# Patient Record
Sex: FEMALE | Race: WHITE | NOT HISPANIC OR LATINO
[De-identification: names, ages, dates, MRNs, and addresses within clinical notes are randomized per-mention and may not be internally consistent; named-entity substitution may affect disease eponyms.]

---

## 2019-07-12 ENCOUNTER — TRANSCRIPTION ENCOUNTER (OUTPATIENT)
Age: 59
End: 2019-07-12

## 2021-03-31 ENCOUNTER — TRANSCRIPTION ENCOUNTER (OUTPATIENT)
Age: 61
End: 2021-03-31

## 2021-04-01 ENCOUNTER — TRANSCRIPTION ENCOUNTER (OUTPATIENT)
Age: 61
End: 2021-04-01

## 2021-04-01 ENCOUNTER — EMERGENCY (EMERGENCY)
Facility: HOSPITAL | Age: 61
LOS: 1 days | Discharge: HOME | End: 2021-04-01
Attending: EMERGENCY MEDICINE
Payer: COMMERCIAL

## 2021-04-01 VITALS
RESPIRATION RATE: 16 BRPM | SYSTOLIC BLOOD PRESSURE: 111 MMHG | DIASTOLIC BLOOD PRESSURE: 80 MMHG | HEART RATE: 58 BPM | OXYGEN SATURATION: 100 % | TEMPERATURE: 98 F

## 2021-04-01 VITALS
DIASTOLIC BLOOD PRESSURE: 81 MMHG | OXYGEN SATURATION: 100 % | WEIGHT: 128.97 LBS | TEMPERATURE: 97 F | SYSTOLIC BLOOD PRESSURE: 172 MMHG | RESPIRATION RATE: 16 BRPM | HEART RATE: 93 BPM | HEIGHT: 61 IN

## 2021-04-01 PROCEDURE — 73110 X-RAY EXAM OF WRIST: CPT

## 2021-04-01 PROCEDURE — 73080 X-RAY EXAM OF ELBOW: CPT | Mod: 26,RT

## 2021-04-01 PROCEDURE — 73080 X-RAY EXAM OF ELBOW: CPT

## 2021-04-01 PROCEDURE — 73090 X-RAY EXAM OF FOREARM: CPT | Mod: 26,LT

## 2021-04-01 PROCEDURE — 73130 X-RAY EXAM OF HAND: CPT

## 2021-04-01 PROCEDURE — 90715 TDAP VACCINE 7 YRS/> IM: CPT

## 2021-04-01 PROCEDURE — 73130 X-RAY EXAM OF HAND: CPT | Mod: 26,RT,77

## 2021-04-01 PROCEDURE — 99284 EMERGENCY DEPT VISIT MOD MDM: CPT

## 2021-04-01 PROCEDURE — 99285 EMERGENCY DEPT VISIT HI MDM: CPT | Mod: 25

## 2021-04-01 PROCEDURE — 73090 X-RAY EXAM OF FOREARM: CPT

## 2021-04-01 PROCEDURE — 73130 X-RAY EXAM OF HAND: CPT | Mod: 26,RT

## 2021-04-01 PROCEDURE — 73110 X-RAY EXAM OF WRIST: CPT | Mod: 26,RT

## 2021-04-01 PROCEDURE — 26605 TREAT METACARPAL FRACTURE: CPT | Mod: RT

## 2021-04-01 RX ORDER — IBUPROFEN 200 MG
600 TABLET ORAL ONCE
Refills: 0 | Status: COMPLETED | OUTPATIENT
Start: 2021-04-01 | End: 2021-04-01

## 2021-04-01 RX ORDER — ACETAMINOPHEN 500 MG
650 TABLET ORAL ONCE
Refills: 0 | Status: COMPLETED | OUTPATIENT
Start: 2021-04-01 | End: 2021-04-01

## 2021-04-01 RX ORDER — TETANUS TOXOID, REDUCED DIPHTHERIA TOXOID AND ACELLULAR PERTUSSIS VACCINE, ADSORBED 5; 2.5; 8; 8; 2.5 [IU]/.5ML; [IU]/.5ML; UG/.5ML; UG/.5ML; UG/.5ML
0.5 SUSPENSION INTRAMUSCULAR ONCE
Refills: 0 | Status: COMPLETED | OUTPATIENT
Start: 2021-04-01 | End: 2021-04-01

## 2021-04-01 RX ADMIN — Medication 650 MILLIGRAM(S): at 13:57

## 2021-04-01 RX ADMIN — Medication 650 MILLIGRAM(S): at 13:56

## 2021-04-01 RX ADMIN — TETANUS TOXOID, REDUCED DIPHTHERIA TOXOID AND ACELLULAR PERTUSSIS VACCINE, ADSORBED 0.5 MILLILITER(S): 5; 2.5; 8; 8; 2.5 SUSPENSION INTRAMUSCULAR at 13:56

## 2021-04-01 RX ADMIN — Medication 600 MILLIGRAM(S): at 16:23

## 2021-04-01 NOTE — ED PROVIDER NOTE - CARE PROVIDER_API CALL
Tung Holden  PLASTIC SURGERY  [address] [place] [address]  Phone: [phone]  Fax: [phone]  Follow Up Time:

## 2021-04-01 NOTE — ED ADULT NURSE NOTE - OBJECTIVE STATEMENT
61 year old female pt presented to the ED s/p fall yesterday, pt states her foot got caught on something and fell primarily on her right side, ecchymosis to right hand and palm, with abrasion to right hand and forearm, hematoma to head pt denies LOC, states she took a warm epsom salt bath and felt better, pt denies syncope no nausea no vomiting denies being on any blood thinners

## 2021-04-01 NOTE — ED PROVIDER NOTE - NS ED ROS FT
GENERAL: No fever, chills  EYES: no vision changes, no discharge.   ENT: no difficulty swallowing or speaking   CARDIAC: no chest pain/pressure, SOB, lower extremity swelling  PULMONARY: no cough, SOB  GI: no abdominal pain, n/v/d  : no dysuria  SKIN: no rashes  NEURO: no headache, lightheadedness, paresthesia  MSK: + R hand, wrist, elbow, and ankle pain and paraspinal lumbar pain

## 2021-04-01 NOTE — ED PROVIDER NOTE - PHYSICAL EXAMINATION
Maite Jackson MD  GENERAL: Patient awake alert NAD.  HEENT: NC/AT, Moist mucous membranes, PERRL, EOMI.  LUNGS: CTAB, no wheezes or crackles.   CARDIAC: RRR, no m/r/g.    ABDOMEN: Soft, NT, ND, No rebound, guarding  EXT: No edema. No calf tenderness. CV 2+DP/PT bilaterally.   R ankle tender at lateral aspect. No edema or ecchymosis.  R hand tender at 5th digit + edema and +ecchymosis. sensation intact, movement intact, mild outward angulation of 5th digit.  R hand able to thumbs up, a okay, cross fingers.  R elbow tender at radial epicondyl   MSK: No spinal tenderness, no pain with movement, no deformities. +R lumbar paraspinal tenderness. no skin changes  NEURO: A&Ox3. Moving all extremities. cn 2-12 grossly intact  SKIN: Warm and dry. No rash.  PSYCH: Normal affect. Maite Jackson MD  GENERAL: Patient awake alert NAD.  HEENT: NC/AT, Moist mucous membranes, PERRL, EOMI.  LUNGS: CTAB, no wheezes or crackles.   CARDIAC: RRR, no m/r/g.    ABDOMEN: Soft, NT, ND, No rebound, guarding  EXT: No edema. No calf tenderness. CV 2+DP/PT bilaterally.   R ankle tender at lateral aspect. No edema or ecchymosis.  R hand tender at 5th digit + edema and +ecchymosis. sensation intact, movement intact, mild outward angulation of 5th digit.  R hand able to thumbs up, a okay, cross fingers.  R elbow tender at radial epicondyl   Pelvis stable, non tender. Shoulders non-tender, full ROM.  MSK: No spinal tenderness, no pain with movement, no deformities. +R lumbar paraspinal tenderness. no skin changes  NEURO: A&Ox3. Moving all extremities. cn 2-12 grossly intact  SKIN: Warm and dry. No rash.  PSYCH: Normal affect.

## 2021-04-01 NOTE — ED PROVIDER NOTE - CLINICAL SUMMARY MEDICAL DECISION MAKING FREE TEXT BOX
Manuel: Pt is a 60 yo F with a h/o essential tremors and colitis who presents with R hand, wrist, elbow, and ankle pain s/p fall. Concern for fractures, will xray R hand, wrist, elbow, ankle. Pain control. Per nexus, no clinical indication for CT head/neck. Manuel: Pt is a 60 yo F with a h/o essential tremors and colitis who presents with R hand, wrist, elbow, and ankle pain s/p fall. Concern for fractures, will xray R hand, wrist, elbow, ankle. Pain control. Per nexus, no clinical indication for CT head/neck. Abrasion by wrist is superficial, no concern for open fx

## 2021-04-01 NOTE — ED PROVIDER NOTE - OBJECTIVE STATEMENT
Pt is a 60 yo F with a h/o essential tremors and colitis who presents with R hand, wrist, elbow, and ankle pain s/p fall. She reports a trip and fall yesterday on wood anel, mechanical fall, no palpitations or lightheadedness before. She hit her head but no LOC and is not on AC. She was able to get up by herself and ambulate on scene. She went to urgent care today and was sent to the ed for xrays. Does not remember her last tetanus shot and has small abrasion on R forearm. She denies numbness, endorses mild tingling and weakness in R 5th digit. Endorses paraspinal lumbar pain

## 2021-04-01 NOTE — ED PROVIDER NOTE - CARE PLAN
Principal Discharge DX:	Metacarpal bone fracture   Principal Discharge DX:	Metacarpal bone fracture  Goal:	RIGHT 5th

## 2021-04-01 NOTE — ED CLERICAL - NS ED CLERK NOTE PRE-ARRIVAL INFORMATION; ADDITIONAL PRE-ARRIVAL INFORMATION
CC/Reason For referral:  fell yesterday, hit head with no loc.  whole right side bruised. r/o broken bones  Preferred Consultant(if applicable):  Who admits for you (if needed):  Do you have documents you would like to fax over?  Would you still like to speak to an ED attending?  please call after patient is seen

## 2021-04-01 NOTE — ED PROVIDER NOTE - PATIENT PORTAL LINK FT
You can access the FollowMyHealth Patient Portal offered by [facility] by registering at the following website: [email or web address]. By joining [employer]’s FollowMyHealth portal, you will also be able to view your health information using other applications (apps) compatible with our system. You can access the FollowMyHealth Patient Portal offered by [facility] by registering at the following website: [email or web address]. By joining [employer]’s FollowMyHealth portal, you will also be able to view your health information using other applications (apps) compatible with our system.

## 2021-04-01 NOTE — ED PROVIDER NOTE - ATTENDING CONTRIBUTION TO CARE
Attending MD Marie: I personally have seen and examined this patient.  Resident note reviewed and agree on plan of care and except where noted.  See below for details.     Seen in [place] 65    61F with PMH/PSH including essential tremors, colitis presents to the ED with R hand/wrist/elbow pain, ankle pain s/p trip and fall on wood anel.  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  Denies LOC.  Reports hit R side of head.  Denies change in vision, double vision, sudden loss of vision, headache.  Denies neck pain, reports mild lower back pain.  Reports was able to get up and ambulate after fall.  Denies numbness, weakness in extremities. Denies loss of urinary or bowel continence. Denies chest pain, shortness of breath, palpitations. Denies abdominal pain, nausea, vomiting, diarrhea, blood in stools. Denies dysuria, hematuria, change in urinary habits including frequency, urgency. Denies fevers, chills.       TO BE COMPLETED Attending MD Marie: I personally have seen and examined this patient.  Resident note reviewed and agree on plan of care and except where noted.  See below for details.     Seen in [place] 65    61F with PMH/PSH including essential tremors, colitis presents to the ED with R hand/wrist/elbow pain, ankle pain s/p trip and fall on wood anel.  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  Denies LOC.  Reports hit R side of head.  Denies change in vision, double vision, sudden loss of vision, headache.  Denies neck pain, reports mild lower back pain.  Reports was able to get up and ambulate after fall.  Denies numbness, weakness in extremities. Reports mild tingling to the R pinky.  Denies loss of urinary or bowel continence. Denies chest pain, shortness of breath, palpitations. Denies abdominal pain, nausea, vomiting, diarrhea, blood in stools. Denies dysuria, hematuria, change in urinary habits including frequency, urgency. Denies fevers, chills.   A ten (10) point review of systems was negative other than as stated in the HPI or elsewhere in the chart.    Exam:   General: NAD  HENT: head NCAT, airway patent with moist mucous membranes  Eyes: PERRL  Lungs: lungs CTAB with good inspiratory effort, no wheezing, no rhonchi, no rales  Cardiac: +S1S2, no m/r/g  GI: abdomen soft with +BS, NT, ND  : no CVAT  MSK: FROM at neck, no tenderness to midline palpation, no stepoffs along length of spine, +tenderness along 5th metacarpal, +edema and ecchymosis, cap refill <2s, FROM at all fingers, no snuff box tenderness, no pain with axial loading of thumb, +2 radials, +FROM at R shoulder and R elbow, +tenderness to palpation diffusely to both, +mild diffuse tenderness to lateral aspect of R ankle, no point tenderness, able to bear weight on ankle and ambulate with steady gait, +2 DPs  Neuro: CN 2-12 grossly intact, moving all extremities with 5/5 strength bilateral upper and lower extremities, sensory grossly intact including at radial, median and ulnar n distribution of the R hand, no gross neuro deficits  Psych: normal mood and affect     A/P: 61F s/p fall now with RUE pain greatest at R hand 5th metacarpal, concern for fracture, will also eval for dislocations, will obtain XRs R shoulder, elbow, forearm, wrist, hand, will give TDAP, pain control, reassess

## 2021-04-01 NOTE — ED PROVIDER NOTE - NSFOLLOWUPINSTRUCTIONS_ED_ALL_ED_FT
You were seen in the ED for pain after a fall.  Your xrays showed a fracture of your 5th metacarpal. Your other xrays did not show any other fractures. Your other pain is likely due to muscle strain and bruising.     The hand surgeon evaluated you and reduced your fracture and put you into a splint.  Follow up with the hand surgeon in 1 week.    For pain, please take 650mg Tylenol every 6 hours as needed or 600mg ibuprofen every 8 hours as needed. Always take ibuprofen with food. You make take both Tylenol and ibuprofen as described above if one medication is not enough for your pain.    Please follow up with your primary care doctor in 2-3 days. Return to the ED if you experience any worsening or new symptoms or any symptoms that concern you, including fevers, chills, shortness of breath, chest pain, weakness, numbness, worsening headache

## 2021-04-22 ENCOUNTER — TRANSCRIPTION ENCOUNTER (OUTPATIENT)
Age: 61
End: 2021-04-22

## 2021-04-22 ENCOUNTER — RESULT REVIEW (OUTPATIENT)
Age: 61
End: 2021-04-22

## 2021-04-29 ENCOUNTER — TRANSCRIPTION ENCOUNTER (OUTPATIENT)
Age: 61
End: 2021-04-29

## 2021-05-19 ENCOUNTER — TRANSCRIPTION ENCOUNTER (OUTPATIENT)
Age: 61
End: 2021-05-19

## 2021-12-07 ENCOUNTER — TRANSCRIPTION ENCOUNTER (OUTPATIENT)
Age: 61
End: 2021-12-07

## 2022-04-19 ENCOUNTER — APPOINTMENT (OUTPATIENT)
Dept: ORTHOPEDIC SURGERY | Facility: CLINIC | Age: 62
End: 2022-04-19

## 2023-08-30 VITALS — WEIGHT: 133 LBS | BODY MASS INDEX: 25.11 KG/M2 | HEIGHT: 61 IN

## 2023-08-30 DIAGNOSIS — Z85.3 PERSONAL HISTORY OF MALIGNANT NEOPLASM OF BREAST: ICD-10-CM

## 2023-08-30 DIAGNOSIS — Z83.3 FAMILY HISTORY OF DIABETES MELLITUS: ICD-10-CM

## 2023-08-30 DIAGNOSIS — Z87.19 PERSONAL HISTORY OF OTHER DISEASES OF THE DIGESTIVE SYSTEM: ICD-10-CM

## 2023-08-30 DIAGNOSIS — Z78.9 OTHER SPECIFIED HEALTH STATUS: ICD-10-CM

## 2023-08-30 DIAGNOSIS — Z82.5 FAMILY HISTORY OF ASTHMA AND OTHER CHRONIC LOWER RESPIRATORY DISEASES: ICD-10-CM

## 2023-09-05 ENCOUNTER — APPOINTMENT (OUTPATIENT)
Dept: NEUROSURGERY | Facility: CLINIC | Age: 63
End: 2023-09-05
Payer: COMMERCIAL

## 2023-09-05 ENCOUNTER — NON-APPOINTMENT (OUTPATIENT)
Age: 63
End: 2023-09-05

## 2023-09-05 VITALS
SYSTOLIC BLOOD PRESSURE: 111 MMHG | OXYGEN SATURATION: 97 % | WEIGHT: 133 LBS | BODY MASS INDEX: 25.11 KG/M2 | HEIGHT: 61 IN | HEART RATE: 67 BPM | DIASTOLIC BLOOD PRESSURE: 73 MMHG

## 2023-09-05 DIAGNOSIS — G25.0 ESSENTIAL TREMOR: ICD-10-CM

## 2023-09-05 PROCEDURE — 99205 OFFICE O/P NEW HI 60 MIN: CPT

## 2023-09-05 RX ORDER — ANASTROZOLE TABLETS 1 MG/1
1 TABLET ORAL
Refills: 0 | Status: ACTIVE | COMMUNITY

## 2023-09-05 RX ORDER — PRIMIDONE 50 MG/1
50 TABLET ORAL
Refills: 0 | Status: ACTIVE | COMMUNITY

## 2023-09-05 NOTE — ASSESSMENT
[FreeTextEntry1] : IMPRESSION: 63F with PMH of severe ET, colitis. She presents for HIFU consult.  BRAEDEN SAPP is a 63 year old right handed female with PMH of severe essential tremor, colitis, breast cancer s/p left total mastectomy 2023. No anticoagulants/antiplatelets. She presents for HIFU consult. Tremor started around 3612-0164 in both hands and head, R>L. She was diagnosed with essential tremor in 1992 and started on Primidone. Dose has been titrated up and gives her some tremor relief. She takes 200 mg at bedtime currently. No family history of tremor.  Tremor worse with stress, anxiety, caffeine. ADLs like writing, drawing, painting, holding liquids/coffee, applying makeup. 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  PLAN:

## 2023-09-05 NOTE — PHYSICAL EXAM
[General Appearance - Alert] : alert [General Appearance - In No Acute Distress] : in no acute distress [General Appearance - Well Nourished] : well nourished [General Appearance - Well-Appearing] : healthy appearing [Oriented To Time, Place, And Person] : oriented to person, place, and time [Affect] : the affect was normal [Mood] : the mood was normal [Person] : oriented to person [Place] : oriented to place [Time] : oriented to time [Short Term Intact] : short term memory intact [Remote Intact] : remote memory intact [Cranial Nerves Oculomotor (III)] : extraocular motion intact [Cranial Nerves Trigeminal (V)] : facial sensation intact symmetrically [Cranial Nerves Facial (VII)] : face symmetrical [Cranial Nerves Vestibulocochlear (VIII)] : hearing was intact bilaterally [Motor Handedness Right-Handed] : the patient is right hand dominant [Sensation Tactile Decrease] : light touch was intact [Tremor] : a tremor present [] : no respiratory distress [Respiration, Rhythm And Depth] : normal respiratory rhythm and effort [Abnormal Walk] : normal gait [Skin Color & Pigmentation] : normal skin color and pigmentation [FreeTextEntry8] : +head tremor, mild right resting tremor, postural with arms extended: 3 cm right, 1 cm left, winged: 3 cm right, 1 cm left, finger to nose: 1 cm right, 2 cm left. Moderate to severe tremor with spiral drawing. Tremor present holding mug and bringing to mouth with right hand. Can tandem gait with difficult. Sway with tandem stance and standing with feet together.

## 2023-09-05 NOTE — HISTORY OF PRESENT ILLNESS
[> 3 months] : more  than 3 months [FreeTextEntry1] : tremor [de-identified] : BRAEDEN SAPP is a 63 year old right handed female with PMH of severe essential tremor, colitis, breast cancer s/p left total mastectomy 2023. No anticoagulants/antiplatelets. She presents for neurosurgical consultation for HIFU. She is under the care of neurologist Dr. Babcock of [facility]. Tremor started around [id number] in both hands and head, R>L. She was diagnosed with essential tremor in 1992 and started on Primidone. Dose has been titrated up and gives her some tremor relief. She takes 200 mg at bedtime currently. She hasn't tried other meds. No family history of tremor.  Tremor worse with stress, anxiety, caffeine. ADLs like writing, drawing, painting, holding liquids/coffee, applying makeup. 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

## 2025-02-21 ENCOUNTER — NON-APPOINTMENT (OUTPATIENT)
Age: 65
End: 2025-02-21